# Patient Record
Sex: MALE | Race: WHITE | Employment: FULL TIME | URBAN - METROPOLITAN AREA
[De-identification: names, ages, dates, MRNs, and addresses within clinical notes are randomized per-mention and may not be internally consistent; named-entity substitution may affect disease eponyms.]

---

## 2019-03-12 ENCOUNTER — OFFICE VISIT (OUTPATIENT)
Dept: INTERNAL MEDICINE CLINIC | Age: 49
End: 2019-03-12

## 2019-03-12 VITALS
SYSTOLIC BLOOD PRESSURE: 138 MMHG | HEART RATE: 88 BPM | DIASTOLIC BLOOD PRESSURE: 98 MMHG | WEIGHT: 298 LBS | OXYGEN SATURATION: 95 % | BODY MASS INDEX: 41.72 KG/M2 | HEIGHT: 71 IN

## 2019-03-12 DIAGNOSIS — M79.672 BILATERAL FOOT PAIN: ICD-10-CM

## 2019-03-12 DIAGNOSIS — R73.09 ELEVATED GLUCOSE: ICD-10-CM

## 2019-03-12 DIAGNOSIS — I10 ESSENTIAL HYPERTENSION, BENIGN: Primary | ICD-10-CM

## 2019-03-12 DIAGNOSIS — M79.671 BILATERAL FOOT PAIN: ICD-10-CM

## 2019-03-12 DIAGNOSIS — E78.2 MIXED HYPERLIPIDEMIA: ICD-10-CM

## 2019-03-12 DIAGNOSIS — E66.01 MORBID OBESITY (HCC): ICD-10-CM

## 2019-03-12 LAB — HBA1C MFR BLD: 5.6 %

## 2019-03-12 PROCEDURE — 99214 OFFICE O/P EST MOD 30 MIN: CPT | Performed by: INTERNAL MEDICINE

## 2019-03-12 PROCEDURE — 83036 HEMOGLOBIN GLYCOSYLATED A1C: CPT | Performed by: INTERNAL MEDICINE

## 2019-03-12 RX ORDER — IBUPROFEN 200 MG
200 TABLET ORAL EVERY 6 HOURS PRN
COMMUNITY
End: 2021-12-30

## 2019-03-12 RX ORDER — LISINOPRIL AND HYDROCHLOROTHIAZIDE 12.5; 1 MG/1; MG/1
1 TABLET ORAL DAILY
Qty: 90 TABLET | Refills: 0 | Status: ON HOLD | OUTPATIENT
Start: 2019-03-12 | End: 2022-09-12 | Stop reason: SDUPTHER

## 2019-03-12 SDOH — HEALTH STABILITY: MENTAL HEALTH: HOW OFTEN DO YOU HAVE A DRINK CONTAINING ALCOHOL?: MONTHLY OR LESS

## 2019-03-12 ASSESSMENT — PATIENT HEALTH QUESTIONNAIRE - PHQ9
1. LITTLE INTEREST OR PLEASURE IN DOING THINGS: 0
SUM OF ALL RESPONSES TO PHQ QUESTIONS 1-9: 0
SUM OF ALL RESPONSES TO PHQ QUESTIONS 1-9: 0
SUM OF ALL RESPONSES TO PHQ9 QUESTIONS 1 & 2: 0
2. FEELING DOWN, DEPRESSED OR HOPELESS: 0

## 2021-12-30 ENCOUNTER — APPOINTMENT (OUTPATIENT)
Dept: GENERAL RADIOLOGY | Age: 51
End: 2021-12-30
Payer: COMMERCIAL

## 2021-12-30 ENCOUNTER — HOSPITAL ENCOUNTER (EMERGENCY)
Age: 51
Discharge: HOME | End: 2021-12-30
Attending: STUDENT IN AN ORGANIZED HEALTH CARE EDUCATION/TRAINING PROGRAM
Payer: COMMERCIAL

## 2021-12-30 VITALS
SYSTOLIC BLOOD PRESSURE: 142 MMHG | DIASTOLIC BLOOD PRESSURE: 86 MMHG | TEMPERATURE: 99 F | OXYGEN SATURATION: 95 % | WEIGHT: 300 LBS | HEART RATE: 74 BPM | HEIGHT: 71 IN | RESPIRATION RATE: 18 BRPM | BODY MASS INDEX: 42 KG/M2

## 2021-12-30 DIAGNOSIS — Z20.822 SUSPECTED COVID-19 VIRUS INFECTION: ICD-10-CM

## 2021-12-30 DIAGNOSIS — J06.9 VIRAL URI WITH COUGH: Primary | ICD-10-CM

## 2021-12-30 LAB
RAPID INFLUENZA  B AGN: NEGATIVE
RAPID INFLUENZA A AGN: NEGATIVE
SARS-COV-2: DETECTED

## 2021-12-30 PROCEDURE — 99284 EMERGENCY DEPT VISIT MOD MDM: CPT

## 2021-12-30 PROCEDURE — U0005 INFEC AGEN DETEC AMPLI PROBE: HCPCS

## 2021-12-30 PROCEDURE — U0003 INFECTIOUS AGENT DETECTION BY NUCLEIC ACID (DNA OR RNA); SEVERE ACUTE RESPIRATORY SYNDROME CORONAVIRUS 2 (SARS-COV-2) (CORONAVIRUS DISEASE [COVID-19]), AMPLIFIED PROBE TECHNIQUE, MAKING USE OF HIGH THROUGHPUT TECHNOLOGIES AS DESCRIBED BY CMS-2020-01-R: HCPCS

## 2021-12-30 PROCEDURE — 87804 INFLUENZA ASSAY W/OPTIC: CPT

## 2021-12-30 PROCEDURE — 71045 X-RAY EXAM CHEST 1 VIEW: CPT

## 2021-12-30 ASSESSMENT — PAIN DESCRIPTION - LOCATION: LOCATION: GENERALIZED

## 2021-12-30 ASSESSMENT — PAIN DESCRIPTION - PROGRESSION: CLINICAL_PROGRESSION: NOT CHANGED

## 2021-12-30 ASSESSMENT — PAIN DESCRIPTION - FREQUENCY: FREQUENCY: CONTINUOUS

## 2021-12-30 ASSESSMENT — PAIN DESCRIPTION - PAIN TYPE: TYPE: ACUTE PAIN

## 2021-12-30 ASSESSMENT — PAIN SCALES - GENERAL: PAINLEVEL_OUTOF10: 5

## 2021-12-30 ASSESSMENT — PAIN DESCRIPTION - DESCRIPTORS: DESCRIPTORS: ACHING;SORE

## 2021-12-30 ASSESSMENT — PAIN DESCRIPTION - ONSET: ONSET: ON-GOING

## 2021-12-30 NOTE — ED NOTES
AVS provided and reviewed with the patient. The patient verbalized understanding of care at home, follow up care, and emergent symptoms to return for. No questions or concerns verbalized at this time. The patient is alert, oriented, stable, and ambulatory out of the department at the time of discharge.        Penny Noel RN  12/30/21 9155

## 2021-12-30 NOTE — ED PROVIDER NOTES
[facility] EMERGENCY DEPARTMENT      CHIEF COMPLAINT  URI (c/o cough and aches since Mary day.)     HISTORY OF PRESENT ILLNESS  Rona Hart II is a 49 y.o. male  who presents to the ED complaining of nonproductive cough, fatigue, fevers, and body aches. Patient states that symptoms started on Mary day. States that he was exposed to his children who are apparently sick as well. He states that at times he does become short of breath. He denies any actual chest pain but states that it occasionally does feel tight. He states that he feels similar to when he has had pneumonia in the past and states he has had approximately 5-6 times. He had not taken anything this morning for his symptoms. He did not receive his Covid vaccine. He denies other complaints or concerns. No other complaints, modifying factors or associated symptoms. I have reviewed the following from the nursing documentation.     Past Medical History:   Diagnosis Date    Hernia, umbilical, with gangrene     for the last 3-4 years    Hyperlipidemia 9/10/2015    Hypertension      Past Surgical History:   Procedure Laterality Date    BACK SURGERY  2009    L4-S1 fusion:tire related injury:Dr. Parmar     Family History   Problem Relation Age of Onset    COPD Mother         diverticulititis     Other Father 77        brain tumor:type not known    Other Brother         diverticulitis     Rheum Arthritis Neg Hx     Osteoarthritis Neg Hx     Asthma Neg Hx     Cancer Neg Hx     Diabetes Neg Hx     Heart Failure Neg Hx     High Cholesterol Neg Hx     Hypertension Neg Hx     Migraines Neg Hx     Rashes/Skin Problems Neg Hx     Seizures Neg Hx     Stroke Neg Hx     Thyroid Disease Neg Hx      Social History     Socioeconomic History    Marital status:      Spouse name: Not on file    Number of children: Not on file    Years of education: Not on file    Highest education level: Not on file Occupational History    Occupation: Unemployed   Tobacco Use    Smoking status: Current Some Day Smoker     Packs/day: 0.25     Years: 20.00     Pack years: 5.00     Types: Cigarettes     Last attempt to quit: 2015     Years since quittin.4    Smokeless tobacco: Never Used    Tobacco comment: trying to quit    Substance and Sexual Activity    Alcohol use: Yes     Alcohol/week: 0.0 standard drinks     Comment: very rarely    Drug use: No    Sexual activity: Not Currently   Other Topics Concern    Not on file   Social History Narrative    Not on file     Social Determinants of Health     Financial Resource Strain:     Difficulty of Paying Living Expenses: Not on file   Food Insecurity:     Worried About Running Out of Food in the Last Year: Not on file    [place] of Food in the Last Year: Not on file   Transportation Needs:     Lack of Transportation (Medical): Not on file    Lack of Transportation (Non-Medical): Not on file   Physical Activity:     Days of Exercise per Week: Not on file    Minutes of Exercise per Session: Not on file   Stress:     Feeling of Stress : Not on file   Social Connections:     Frequency of Communication with Friends and Family: Not on file    Frequency of Social Gatherings with Friends and Family: Not on file    Attends Restoration Services: Not on file    Active Member of [address] [place] [address] [employer] or Organizations: Not on file    Attends Club or Organization Meetings: Not on file    Marital Status: Not on file   Intimate Partner Violence:     Fear of Current or Ex-Partner: Not on file    Emotionally Abused: Not on file    Physically Abused: Not on file    Sexually Abused: Not on file   Housing Stability:     Unable to Pay for Housing in the Last Year: Not on file    Number of [place] in the Last Year: Not on file    Unstable Housing in the Last Year: Not on file     No current facility-administered medications for this encounter.      Current Outpatient Medications body aches. Full HPI as detailed above. Upon arrival in the ED, vitals reassuring. Patient is resting comfortably is no acute distress. Rapid flu is negative. Chest x-ray without any acute cardiopulmonary process. Suspect possible Covid infection. Advised to take over-the-counter Tylenol/Motrin and over-the-counter cold medications for symptoms. Covid test will be sent, patient was advised to treat himself is that he is positive in the meantime until test results return. Given strict return precautions for the ED. He is comfortable in agreement plan of care was discharged. During the patient's ED course, the patient was given:  Medications - No data to display     CLINICAL IMPRESSION  1. Viral URI with cough    2. Suspected COVID-19 virus infection        Blood pressure (!) 142/86, pulse 74, temperature 99 °F (37.2 °C), temperature source Oral, resp. rate 18, height 5' 11\" (1.803 m), weight 300 lb (136.1 kg), SpO2 95 %. DISPOSITION  Yohannes Pérez II was discharged to home in good condition. Patient was given scripts for the following medications. I counseled patient how to take these medications. Discharge Medication List as of 12/30/2021 11:29 AM          Follow-up with:  [place] [address]  [phone]    Schedule an appointment as soon as possible for a visit       [place] [address]. [facility] Emergency Department  [address] [place] [address],[address]  [phone]  Go to   If symptoms worsen      DISCLAIMER: This chart was created using Dragon dictation software. Efforts were made by me to ensure accuracy, however some errors may be present due to limitations of this technology and occasionally words are not transcribed correctly.        Shoaib Perrin MD  12/30/21 1480

## 2022-09-09 ENCOUNTER — APPOINTMENT (OUTPATIENT)
Dept: CT IMAGING | Age: 52
End: 2022-09-09
Payer: COMMERCIAL

## 2022-09-09 ENCOUNTER — HOSPITAL ENCOUNTER (OUTPATIENT)
Age: 52
Setting detail: OBSERVATION
Discharge: HOME | End: 2022-09-12
Attending: EMERGENCY MEDICINE | Admitting: INTERNAL MEDICINE
Payer: COMMERCIAL

## 2022-09-09 DIAGNOSIS — H70.90 MASTOIDITIS, UNSPECIFIED LATERALITY: ICD-10-CM

## 2022-09-09 DIAGNOSIS — H81.399 PERIPHERAL VERTIGO, UNSPECIFIED LATERALITY: Primary | ICD-10-CM

## 2022-09-09 PROBLEM — H70.003: Status: ACTIVE | Noted: 2022-09-09

## 2022-09-09 LAB
A/G RATIO: 1.3 (ref 1.1–2.2)
ALBUMIN SERPL-MCNC: 4.3 G/DL (ref 3.4–5)
ALP BLD-CCNC: 79 U/L (ref 40–129)
ALT SERPL-CCNC: 14 U/L (ref 10–40)
ANION GAP SERPL CALCULATED.3IONS-SCNC: 11 MMOL/L (ref 3–16)
AST SERPL-CCNC: 17 U/L (ref 15–37)
BASOPHILS ABSOLUTE: 0.1 K/UL (ref 0–0.2)
BASOPHILS RELATIVE PERCENT: 0.9 %
BILIRUB SERPL-MCNC: 0.3 MG/DL (ref 0–1)
BUN BLDV-MCNC: 16 MG/DL (ref 7–20)
CALCIUM SERPL-MCNC: 8.8 MG/DL (ref 8.3–10.6)
CHLORIDE BLD-SCNC: 100 MMOL/L (ref 99–110)
CO2: 26 MMOL/L (ref 21–32)
CREAT SERPL-MCNC: 0.8 MG/DL (ref 0.9–1.3)
EKG ATRIAL RATE: 75 BPM
EKG DIAGNOSIS: NORMAL
EKG P AXIS: 45 DEGREES
EKG P-R INTERVAL: 150 MS
EKG Q-T INTERVAL: 406 MS
EKG QRS DURATION: 86 MS
EKG QTC CALCULATION (BAZETT): 453 MS
EKG R AXIS: 6 DEGREES
EKG T AXIS: 26 DEGREES
EKG VENTRICULAR RATE: 75 BPM
EOSINOPHILS ABSOLUTE: 0.2 K/UL (ref 0–0.6)
EOSINOPHILS RELATIVE PERCENT: 1.6 %
GFR AFRICAN AMERICAN: >60
GFR NON-AFRICAN AMERICAN: >60
GLUCOSE BLD-MCNC: 146 MG/DL (ref 70–99)
HCT VFR BLD CALC: 44 % (ref 40.5–52.5)
HEMOGLOBIN: 15 G/DL (ref 13.5–17.5)
LYMPHOCYTES ABSOLUTE: 1.5 K/UL (ref 1–5.1)
LYMPHOCYTES RELATIVE PERCENT: 13.3 %
MCH RBC QN AUTO: 29.9 PG (ref 26–34)
MCHC RBC AUTO-ENTMCNC: 34.1 G/DL (ref 31–36)
MCV RBC AUTO: 87.5 FL (ref 80–100)
MONOCYTES ABSOLUTE: 0.7 K/UL (ref 0–1.3)
MONOCYTES RELATIVE PERCENT: 6 %
NEUTROPHILS ABSOLUTE: 8.8 K/UL (ref 1.7–7.7)
NEUTROPHILS RELATIVE PERCENT: 78.2 %
PDW BLD-RTO: 15 % (ref 12.4–15.4)
PLATELET # BLD: 253 K/UL (ref 135–450)
PMV BLD AUTO: 8.3 FL (ref 5–10.5)
POTASSIUM REFLEX MAGNESIUM: 3.8 MMOL/L (ref 3.5–5.1)
RBC # BLD: 5.02 M/UL (ref 4.2–5.9)
SARS-COV-2, NAAT: NOT DETECTED
SODIUM BLD-SCNC: 137 MMOL/L (ref 136–145)
TOTAL PROTEIN: 7.5 G/DL (ref 6.4–8.2)
TROPONIN: <0.01 NG/ML
WBC # BLD: 11.3 K/UL (ref 4–11)

## 2022-09-09 PROCEDURE — 96368 THER/DIAG CONCURRENT INF: CPT

## 2022-09-09 PROCEDURE — 80053 COMPREHEN METABOLIC PANEL: CPT

## 2022-09-09 PROCEDURE — 6360000004 HC RX CONTRAST MEDICATION: Performed by: EMERGENCY MEDICINE

## 2022-09-09 PROCEDURE — 93010 ELECTROCARDIOGRAM REPORT: CPT | Performed by: INTERNAL MEDICINE

## 2022-09-09 PROCEDURE — 99285 EMERGENCY DEPT VISIT HI MDM: CPT

## 2022-09-09 PROCEDURE — 96366 THER/PROPH/DIAG IV INF ADDON: CPT

## 2022-09-09 PROCEDURE — 70450 CT HEAD/BRAIN W/O DYE: CPT

## 2022-09-09 PROCEDURE — G0378 HOSPITAL OBSERVATION PER HR: HCPCS

## 2022-09-09 PROCEDURE — 6370000000 HC RX 637 (ALT 250 FOR IP): Performed by: EMERGENCY MEDICINE

## 2022-09-09 PROCEDURE — 70496 CT ANGIOGRAPHY HEAD: CPT

## 2022-09-09 PROCEDURE — 84484 ASSAY OF TROPONIN QUANT: CPT

## 2022-09-09 PROCEDURE — 96372 THER/PROPH/DIAG INJ SC/IM: CPT

## 2022-09-09 PROCEDURE — 96367 TX/PROPH/DG ADDL SEQ IV INF: CPT

## 2022-09-09 PROCEDURE — 2580000003 HC RX 258: Performed by: NURSE PRACTITIONER

## 2022-09-09 PROCEDURE — 85025 COMPLETE CBC W/AUTO DIFF WBC: CPT

## 2022-09-09 PROCEDURE — 96376 TX/PRO/DX INJ SAME DRUG ADON: CPT

## 2022-09-09 PROCEDURE — 96375 TX/PRO/DX INJ NEW DRUG ADDON: CPT

## 2022-09-09 PROCEDURE — 93005 ELECTROCARDIOGRAM TRACING: CPT | Performed by: EMERGENCY MEDICINE

## 2022-09-09 PROCEDURE — 6360000002 HC RX W HCPCS: Performed by: NURSE PRACTITIONER

## 2022-09-09 PROCEDURE — 96361 HYDRATE IV INFUSION ADD-ON: CPT

## 2022-09-09 PROCEDURE — 87635 SARS-COV-2 COVID-19 AMP PRB: CPT

## 2022-09-09 PROCEDURE — 2580000003 HC RX 258: Performed by: EMERGENCY MEDICINE

## 2022-09-09 PROCEDURE — 36415 COLL VENOUS BLD VENIPUNCTURE: CPT

## 2022-09-09 PROCEDURE — 6370000000 HC RX 637 (ALT 250 FOR IP): Performed by: NURSE PRACTITIONER

## 2022-09-09 PROCEDURE — 6360000002 HC RX W HCPCS: Performed by: EMERGENCY MEDICINE

## 2022-09-09 PROCEDURE — 96365 THER/PROPH/DIAG IV INF INIT: CPT

## 2022-09-09 RX ORDER — POLYETHYLENE GLYCOL 3350 17 G/17G
17 POWDER, FOR SOLUTION ORAL DAILY PRN
Status: DISCONTINUED | OUTPATIENT
Start: 2022-09-09 | End: 2022-09-12 | Stop reason: HOSPADM

## 2022-09-09 RX ORDER — DIAZEPAM 2 MG/1
2 TABLET ORAL EVERY 6 HOURS PRN
Status: DISCONTINUED | OUTPATIENT
Start: 2022-09-09 | End: 2022-09-11

## 2022-09-09 RX ORDER — ACETAMINOPHEN 650 MG/1
650 SUPPOSITORY RECTAL EVERY 6 HOURS PRN
Status: DISCONTINUED | OUTPATIENT
Start: 2022-09-09 | End: 2022-09-12 | Stop reason: HOSPADM

## 2022-09-09 RX ORDER — 0.9 % SODIUM CHLORIDE 0.9 %
1000 INTRAVENOUS SOLUTION INTRAVENOUS ONCE
Status: COMPLETED | OUTPATIENT
Start: 2022-09-09 | End: 2022-09-09

## 2022-09-09 RX ORDER — ENOXAPARIN SODIUM 100 MG/ML
30 INJECTION SUBCUTANEOUS 2 TIMES DAILY
Status: DISCONTINUED | OUTPATIENT
Start: 2022-09-09 | End: 2022-09-12

## 2022-09-09 RX ORDER — ONDANSETRON 2 MG/ML
4 INJECTION INTRAMUSCULAR; INTRAVENOUS ONCE
Status: COMPLETED | OUTPATIENT
Start: 2022-09-09 | End: 2022-09-09

## 2022-09-09 RX ORDER — MECLIZINE HYDROCHLORIDE CHEWABLE TABLETS 25 MG/1
25 TABLET, CHEWABLE ORAL ONCE
Status: COMPLETED | OUTPATIENT
Start: 2022-09-09 | End: 2022-09-09

## 2022-09-09 RX ORDER — LISINOPRIL AND HYDROCHLOROTHIAZIDE 12.5; 1 MG/1; MG/1
1 TABLET ORAL DAILY
Status: DISCONTINUED | OUTPATIENT
Start: 2022-09-10 | End: 2022-09-12 | Stop reason: HOSPADM

## 2022-09-09 RX ORDER — ONDANSETRON 2 MG/ML
4 INJECTION INTRAMUSCULAR; INTRAVENOUS EVERY 6 HOURS PRN
Status: DISCONTINUED | OUTPATIENT
Start: 2022-09-09 | End: 2022-09-12 | Stop reason: HOSPADM

## 2022-09-09 RX ORDER — SODIUM CHLORIDE 0.9 % (FLUSH) 0.9 %
5-40 SYRINGE (ML) INJECTION EVERY 12 HOURS SCHEDULED
Status: DISCONTINUED | OUTPATIENT
Start: 2022-09-09 | End: 2022-09-12 | Stop reason: HOSPADM

## 2022-09-09 RX ORDER — ONDANSETRON 4 MG/1
4 TABLET, ORALLY DISINTEGRATING ORAL EVERY 8 HOURS PRN
Status: DISCONTINUED | OUTPATIENT
Start: 2022-09-09 | End: 2022-09-12 | Stop reason: HOSPADM

## 2022-09-09 RX ORDER — SODIUM CHLORIDE 0.9 % (FLUSH) 0.9 %
5-40 SYRINGE (ML) INJECTION PRN
Status: DISCONTINUED | OUTPATIENT
Start: 2022-09-09 | End: 2022-09-12 | Stop reason: HOSPADM

## 2022-09-09 RX ORDER — DIAZEPAM 5 MG/1
5 TABLET ORAL ONCE
Status: COMPLETED | OUTPATIENT
Start: 2022-09-09 | End: 2022-09-09

## 2022-09-09 RX ORDER — ACETAMINOPHEN 325 MG/1
650 TABLET ORAL EVERY 6 HOURS PRN
Status: DISCONTINUED | OUTPATIENT
Start: 2022-09-09 | End: 2022-09-12 | Stop reason: HOSPADM

## 2022-09-09 RX ORDER — SODIUM CHLORIDE 9 MG/ML
INJECTION, SOLUTION INTRAVENOUS PRN
Status: DISCONTINUED | OUTPATIENT
Start: 2022-09-09 | End: 2022-09-12 | Stop reason: HOSPADM

## 2022-09-09 RX ORDER — MECLIZINE HYDROCHLORIDE 25 MG/1
12.5 TABLET ORAL EVERY 8 HOURS
Status: DISCONTINUED | OUTPATIENT
Start: 2022-09-09 | End: 2022-09-10

## 2022-09-09 RX ADMIN — MECLIZINE HYDROCHLORIDE 12.5 MG: 25 TABLET ORAL at 20:51

## 2022-09-09 RX ADMIN — ONDANSETRON HYDROCHLORIDE 4 MG: 2 INJECTION, SOLUTION INTRAMUSCULAR; INTRAVENOUS at 15:14

## 2022-09-09 RX ADMIN — ENOXAPARIN SODIUM 30 MG: 100 INJECTION SUBCUTANEOUS at 20:51

## 2022-09-09 RX ADMIN — MECLIZINE HYDROCHLORIDE 25 MG: 25 TABLET, CHEWABLE ORAL at 14:16

## 2022-09-09 RX ADMIN — IOPAMIDOL 85 ML: 755 INJECTION, SOLUTION INTRAVENOUS at 13:59

## 2022-09-09 RX ADMIN — Medication 10 ML: at 20:51

## 2022-09-09 RX ADMIN — VANCOMYCIN HYDROCHLORIDE 2000 MG: 1 INJECTION, POWDER, LYOPHILIZED, FOR SOLUTION INTRAVENOUS at 17:10

## 2022-09-09 RX ADMIN — ONDANSETRON HYDROCHLORIDE 4 MG: 2 INJECTION, SOLUTION INTRAMUSCULAR; INTRAVENOUS at 20:51

## 2022-09-09 RX ADMIN — DIAZEPAM 5 MG: 5 TABLET ORAL at 14:16

## 2022-09-09 RX ADMIN — SODIUM CHLORIDE 1000 ML: 9 INJECTION, SOLUTION INTRAVENOUS at 14:16

## 2022-09-09 RX ADMIN — CEFEPIME 2000 MG: 2 INJECTION, POWDER, FOR SOLUTION INTRAVENOUS at 16:23

## 2022-09-09 ASSESSMENT — LIFESTYLE VARIABLES
HOW MANY STANDARD DRINKS CONTAINING ALCOHOL DO YOU HAVE ON A TYPICAL DAY: 1 OR 2
HOW OFTEN DO YOU HAVE A DRINK CONTAINING ALCOHOL: 2-4 TIMES A MONTH

## 2022-09-09 ASSESSMENT — PAIN - FUNCTIONAL ASSESSMENT: PAIN_FUNCTIONAL_ASSESSMENT: NONE - DENIES PAIN

## 2022-09-09 NOTE — ED NOTES
Admitting RN at bedside, report given. Admitting RN advised that room is not clean, and that she will call when room is ready.       Fariba Castillo RN  09/09/22 3546

## 2022-09-09 NOTE — ED NOTES
[address] [facility]- Perfect Serve sent to Dr. Nat Rhodes. Torito Nap  09/09/22 1548     1645-Perfect Serve returned from HAROON Recinos 1617- Call placed to [facility] for consult for ENT. Torito Nap  09/09/22 1702    1722- Call back received from [facility] stating that they have ENT Dr. Joey Justice on the line. Transferred the call to Dr. Amari Alvarenga cell.       [facility]  09/09/22 [address] [facility]  09/09/22 173

## 2022-09-09 NOTE — PLAN OF CARE
Admit to 2w observation    Bilateral mastoiditis - with peripheral vertigo   Dr. Livier Flores discussed with ENT which recommended no antibiotics and to treat symptomatically   Scheduled Meclizine  PRN valium  PRN Zofran       Discussed plan of care with Dr. Mancilla, APRN - CNP

## 2022-09-09 NOTE — LETTER
[phone]  Phone: [phone]             September 12, 2022    Patient: Carmen Alcala II   YOB: 1970   Date of Visit: 9/9/2022       To Whom It May Concern:    Carmen Alcala was seen and treated in our facility  beginning 9/9/2022 until 9/12/2022. He may return to work on 9/19/2022.       Sincerely,       Garret Zhao RN         Signature:__________________________________

## 2022-09-10 LAB
ANION GAP SERPL CALCULATED.3IONS-SCNC: 8 MMOL/L (ref 3–16)
BASOPHILS ABSOLUTE: 0.1 K/UL (ref 0–0.2)
BASOPHILS RELATIVE PERCENT: 0.6 %
BUN BLDV-MCNC: 16 MG/DL (ref 7–20)
CALCIUM SERPL-MCNC: 8.6 MG/DL (ref 8.3–10.6)
CHLORIDE BLD-SCNC: 101 MMOL/L (ref 99–110)
CO2: 29 MMOL/L (ref 21–32)
CREAT SERPL-MCNC: 0.8 MG/DL (ref 0.9–1.3)
EOSINOPHILS ABSOLUTE: 0.1 K/UL (ref 0–0.6)
EOSINOPHILS RELATIVE PERCENT: 1.1 %
GFR AFRICAN AMERICAN: >60
GFR NON-AFRICAN AMERICAN: >60
GLUCOSE BLD-MCNC: 98 MG/DL (ref 70–99)
HCT VFR BLD CALC: 42.9 % (ref 40.5–52.5)
HEMOGLOBIN: 14.1 G/DL (ref 13.5–17.5)
LYMPHOCYTES ABSOLUTE: 1.9 K/UL (ref 1–5.1)
LYMPHOCYTES RELATIVE PERCENT: 17.8 %
MCH RBC QN AUTO: 29.2 PG (ref 26–34)
MCHC RBC AUTO-ENTMCNC: 32.9 G/DL (ref 31–36)
MCV RBC AUTO: 88.7 FL (ref 80–100)
MONOCYTES ABSOLUTE: 0.7 K/UL (ref 0–1.3)
MONOCYTES RELATIVE PERCENT: 6.9 %
NEUTROPHILS ABSOLUTE: 7.8 K/UL (ref 1.7–7.7)
NEUTROPHILS RELATIVE PERCENT: 73.6 %
PDW BLD-RTO: 15.2 % (ref 12.4–15.4)
PLATELET # BLD: 281 K/UL (ref 135–450)
PMV BLD AUTO: 8.9 FL (ref 5–10.5)
POTASSIUM REFLEX MAGNESIUM: 4.2 MMOL/L (ref 3.5–5.1)
RBC # BLD: 4.84 M/UL (ref 4.2–5.9)
SODIUM BLD-SCNC: 138 MMOL/L (ref 136–145)
WBC # BLD: 10.6 K/UL (ref 4–11)

## 2022-09-10 PROCEDURE — 2580000003 HC RX 258: Performed by: NURSE PRACTITIONER

## 2022-09-10 PROCEDURE — 6370000000 HC RX 637 (ALT 250 FOR IP): Performed by: NURSE PRACTITIONER

## 2022-09-10 PROCEDURE — 99234 HOSP IP/OBS SM DT SF/LOW 45: CPT | Performed by: INTERNAL MEDICINE

## 2022-09-10 PROCEDURE — 36415 COLL VENOUS BLD VENIPUNCTURE: CPT

## 2022-09-10 PROCEDURE — 80048 BASIC METABOLIC PNL TOTAL CA: CPT

## 2022-09-10 PROCEDURE — 85025 COMPLETE CBC W/AUTO DIFF WBC: CPT

## 2022-09-10 PROCEDURE — 6370000000 HC RX 637 (ALT 250 FOR IP): Performed by: INTERNAL MEDICINE

## 2022-09-10 PROCEDURE — 6360000002 HC RX W HCPCS: Performed by: NURSE PRACTITIONER

## 2022-09-10 PROCEDURE — G0378 HOSPITAL OBSERVATION PER HR: HCPCS

## 2022-09-10 PROCEDURE — 96372 THER/PROPH/DIAG INJ SC/IM: CPT

## 2022-09-10 RX ORDER — PREDNISONE 20 MG/1
40 TABLET ORAL DAILY
Status: DISCONTINUED | OUTPATIENT
Start: 2022-09-10 | End: 2022-09-12 | Stop reason: HOSPADM

## 2022-09-10 RX ORDER — MECLIZINE HYDROCHLORIDE 25 MG/1
25 TABLET ORAL 3 TIMES DAILY PRN
Status: DISCONTINUED | OUTPATIENT
Start: 2022-09-10 | End: 2022-09-12 | Stop reason: HOSPADM

## 2022-09-10 RX ADMIN — LISINOPRIL AND HYDROCHLOROTHIAZIDE 1 TABLET: 12.5; 1 TABLET ORAL at 08:57

## 2022-09-10 RX ADMIN — Medication 10 ML: at 19:58

## 2022-09-10 RX ADMIN — ACETAMINOPHEN 650 MG: 325 TABLET ORAL at 08:57

## 2022-09-10 RX ADMIN — PREDNISONE 40 MG: 20 TABLET ORAL at 12:35

## 2022-09-10 RX ADMIN — ENOXAPARIN SODIUM 30 MG: 100 INJECTION SUBCUTANEOUS at 08:57

## 2022-09-10 RX ADMIN — MECLIZINE HYDROCHLORIDE 12.5 MG: 25 TABLET ORAL at 05:38

## 2022-09-10 RX ADMIN — Medication 10 ML: at 08:58

## 2022-09-10 RX ADMIN — MECLIZINE HYDROCHLORIDE 25 MG: 25 TABLET ORAL at 12:35

## 2022-09-10 RX ADMIN — ENOXAPARIN SODIUM 30 MG: 100 INJECTION SUBCUTANEOUS at 19:57

## 2022-09-10 RX ADMIN — MECLIZINE HYDROCHLORIDE 25 MG: 25 TABLET ORAL at 19:57

## 2022-09-10 ASSESSMENT — PAIN DESCRIPTION - LOCATION: LOCATION: HEAD

## 2022-09-10 ASSESSMENT — PAIN DESCRIPTION - DESCRIPTORS: DESCRIPTORS: PRESSURE

## 2022-09-10 NOTE — PROGRESS NOTES
Physical Therapy    Patient was followed up twice today for PT evaluation. Patient had moderate to severe dizziness at rest while in the bed and was unable to participate in the evaluation. Patient was educated about his medical condition and coping techniques. Patient will be followed up later when able to participate. No charge.     Alvaro Rowe, MS PT, # [id number]

## 2022-09-10 NOTE — H&P
Admission [facility];  MRN: [id number] ;   Admit Date: 2022 12:48 PM   Current Date and Time: 9/10/2022 11:48 AM    PCP  --  Serena Lizarraga DO         Chief Complaint   Patient presents with    Dizziness     Pt experiencing some dizziness. Has had episodes before but it goes away. Dizzy when he moves his head. Feels pressure in his face and feels nauseated. HPI:  Patient is a 47 y.o.  male  With known h.o  HTN, hyperlipidemia, obesity presented for increasing dizziness for last 2 days. Reports he has bilateral ear pain with no fevers or chills. No associated blurry vision or double vision or neck pain . Reports dizziness occurs anytime he bends or moves his neck and room spins around and feels he is going to fall. No unilateral arm or leg weakness . No new headache or recent trauma. No recent surgeries . Workup in ER showed possible bilateral mastoiditis and no relief with temporary meds and was admitted for overnight observation     Pt denies any similar symptoms in the past or hx of ear issues     No Known Allergies    Past Medical History:   Diagnosis Date    Hernia, umbilical, with gangrene     for the last 3-4 years    Hyperlipidemia 9/10/2015    Hypertension       Past Surgical History:   Procedure Laterality Date    BACK SURGERY      L4-S1 fusion:tire related injury:Dr. Parmar      Medications Prior to Admission: lisinopril-hydrochlorothiazide (PRINZIDE;ZESTORETIC) 10-12.5 MG per tablet, Take 1 tablet by mouth daily (Patient not taking: Reported on 2022)  No Known Allergies   Social History     Tobacco Use    Smoking status: Some Days     Packs/day: 0.25     Years: 20.00     Pack years: 5.00     Types: Cigarettes     Last attempt to quit: 2015     Years since quittin.1    Smokeless tobacco: Never    Tobacco comments:     trying to quit    Substance Use Topics    Alcohol use:  Yes     Alcohol/week: 0.0 standard drinks     Comment: very rarely      Family History   Problem Relation Age of Onset    COPD Mother         diverticulititis     Other Father 77        brain tumor:type not known    Other Brother         diverticulitis     Rheum Arthritis Neg Hx     Osteoarthritis Neg Hx     Asthma Neg Hx     Cancer Neg Hx     Diabetes Neg Hx     Heart Failure Neg Hx     High Cholesterol Neg Hx     Hypertension Neg Hx     Migraines Neg Hx     Rashes/Skin Problems Neg Hx     Seizures Neg Hx     Stroke Neg Hx     Thyroid Disease Neg Hx           Review of systems      Constitutional: Negative for fever or chills  HENT: Negative for sore throat  + bilateral ear ache   Eyes: Negative for redness   Respiratory: Negative  for dyspnea, cough   Cardiovascular: Negative for chest pain   Gastrointestinal:neg for abd pain, nausea or vomiting or diarrhea  No melena or BRPR  Genitourinary: Negative for hematuria   Musculoskeletal: Negative for arthralgias   Skin: Negative for rash   Neurological: Negative for syncope  + dizziness  Hematological: Negative for adenopathy   Psychiatric/Behavorial: Negative for anxiety    Objective:     VS: Temp: 97.6 °F (36.4 °C)  Heart Rate: 78  BP: (!) 138/97  SpO2: 93 %        Physical Exam:  General:  obese middle aged male, Awake, alert and oriented. Appears to be not in any distress  Mucous Membranes:  Pink , anicteric  No mastoid tenderness noted  External ear normal   EOM intact, no nystagmus. Symptoms not reproducible today  Neck: No JVD, no carotid bruit, no thyromegaly  Chest:  Clear to auscultation bilaterally, no added sounds  Cardiovascular:  RRR S1S2 heard, no murmurs or gallops  Abdomen:  Soft, undistended, non tender, no organomegaly, BS present  Extremities: No edema or cyanosis.  Distal pulses well felt  Neurological : no focal deficits  Non focal   CN 2 to 12 intact, coordination normal   Gait not checked as pt reported symptoms         LABS:  CBC:  Lab Results   Component Value Date/Time    WBC 10.6 09/10/2022 05:51 AM HGB 14.1 09/10/2022 05:51 AM    HCT 42.9 09/10/2022 05:51 AM    MCV 88.7 09/10/2022 05:51 AM     09/10/2022 05:51 AM    NEUTOPHILPCT 73.6 09/10/2022 05:51 AM    LYMPHOPCT 17.8 09/10/2022 05:51 AM    MONOPCT 6.9 09/10/2022 05:51 AM    BASOPCT 0.6 09/10/2022 05:51 AM    NEUTROABS 7.8 09/10/2022 05:51 AM    LYMPHSABS 1.9 09/10/2022 05:51 AM    MONOSABS 0.7 09/10/2022 05:51 AM    EOSABS 0.1 09/10/2022 05:51 AM    BASOSABS 0.1 09/10/2022 05:51 AM     BMP:   Lab Results   Component Value Date/Time     09/10/2022 05:51 AM    K 4.2 09/10/2022 05:51 AM    CO2 29 09/10/2022 05:51 AM    BUN 16 09/10/2022 05:51 AM    CREATININE 0.8 09/10/2022 05:51 AM    CALCIUM 8.6 09/10/2022 05:51 AM     Coagulation: No results found for: INR, APTT  Cardiac markers:   Lab Results   Component Value Date/Time    TROPONINI <0.01 09/09/2022 01:05 PM     ABGs: No results found for: POCPH, POCPCO2, POCPO2, POCHCO3, POCTCO2, POCFIO2    Lab Results   Component Value Date    ALT 14 09/09/2022    AST 17 09/09/2022    ALKPHOS 79 09/09/2022    BILITOT 0.3 09/09/2022       No results found for: INR, PROTIME      EKG: NSR     Radiology:    CTA head and neck     1. No evidence of a flow limiting stenosis or dissection of the cervical   carotid/vertebral arteries. 2. No significant stenosis of the [id number]. Ct head    no acute intracranial abnormalities are noted. Bilateral mastoiditis      ASSESMENT & PLAN:     Bilateral mastoiditis - doubt this although ct reported  Pt with no mastoid tenderness, no fevers  Continue abx - change to oral    Dizziness - likely peripheral vertigo - CTA head and neck neg  Ct head neg  No focal symptoms  Obtain PT eval   Meclizine prn . Avoid scheduled dose given rebound symptoms     HTN - resume home meds.  Check orthostatics    Diet:regular   GI/DVT PX: Kathie Carrillo  CODE STATUS: full     Satnam Moncada MD,  9/10/2022 11:48 AM

## 2022-09-10 NOTE — PROGRESS NOTES
4 Eyes Skin Assessment     The patient is being assess for   Admission    I agree that 2 RN's have performed a thorough Head to Toe Skin Assessment on the patient. ALL assessment sites listed below have been assessed. Areas assessed for pressure by both nurses:   [x]   Head, Face, and Ears   [x]   Shoulders, Back, and Chest, Abdomen  [x]   Arms, Elbows, and Hands   [x]   Coccyx, Sacrum, and Ischium  [x]   Legs, Feet, and Heels    Umbilical hernia. No open areas. Skin Assessed Under all Medical Devices by both nurses:  NA            All Mepilex Borders were peeled back and area peeked at by both nurses:  NA  Please list where Mepilex Borders are located:  NA             **SHARE this note so that the co-signing nurse is able to place an eSignature**    Co-signer eSignature: {Esignature:[id number]}    Does the Patient have Skin Breakdown related to pressure?   No            Jose Angel Prevention initiated:  NA   Wound Care Orders initiated:  NA      WOC nurse consulted for Pressure Injury (Stage 3,4, Unstageable, DTI, NWPT, Complex wounds)and New or Established Ostomies:  NA      Primary Nurse eSignature: Electronically signed by Stanley Soto RN on 9/10/22 at 1:51 AM EDT

## 2022-09-11 PROBLEM — H81.399 PERIPHERAL VERTIGO: Status: ACTIVE | Noted: 2022-09-11

## 2022-09-11 PROBLEM — H70.90 MASTOIDITIS: Status: ACTIVE | Noted: 2022-09-11

## 2022-09-11 PROCEDURE — 97116 GAIT TRAINING THERAPY: CPT

## 2022-09-11 PROCEDURE — 6370000000 HC RX 637 (ALT 250 FOR IP): Performed by: NURSE PRACTITIONER

## 2022-09-11 PROCEDURE — 6360000002 HC RX W HCPCS: Performed by: NURSE PRACTITIONER

## 2022-09-11 PROCEDURE — 2580000003 HC RX 258: Performed by: INTERNAL MEDICINE

## 2022-09-11 PROCEDURE — 6370000000 HC RX 637 (ALT 250 FOR IP): Performed by: INTERNAL MEDICINE

## 2022-09-11 PROCEDURE — G0378 HOSPITAL OBSERVATION PER HR: HCPCS

## 2022-09-11 PROCEDURE — 6360000002 HC RX W HCPCS: Performed by: INTERNAL MEDICINE

## 2022-09-11 PROCEDURE — 2580000003 HC RX 258: Performed by: NURSE PRACTITIONER

## 2022-09-11 PROCEDURE — 96367 TX/PROPH/DG ADDL SEQ IV INF: CPT

## 2022-09-11 PROCEDURE — 99224 PR SBSQ OBSERVATION CARE/DAY 15 MINUTES: CPT | Performed by: INTERNAL MEDICINE

## 2022-09-11 PROCEDURE — 96372 THER/PROPH/DIAG INJ SC/IM: CPT

## 2022-09-11 PROCEDURE — 97530 THERAPEUTIC ACTIVITIES: CPT

## 2022-09-11 PROCEDURE — 97161 PT EVAL LOW COMPLEX 20 MIN: CPT

## 2022-09-11 RX ADMIN — ENOXAPARIN SODIUM 30 MG: 100 INJECTION SUBCUTANEOUS at 09:39

## 2022-09-11 RX ADMIN — PREDNISONE 40 MG: 20 TABLET ORAL at 09:39

## 2022-09-11 RX ADMIN — ENOXAPARIN SODIUM 30 MG: 100 INJECTION SUBCUTANEOUS at 21:10

## 2022-09-11 RX ADMIN — SODIUM CHLORIDE: 9 INJECTION, SOLUTION INTRAVENOUS at 11:36

## 2022-09-11 RX ADMIN — CEFTRIAXONE SODIUM 1000 MG: 1 INJECTION, POWDER, FOR SOLUTION INTRAMUSCULAR; INTRAVENOUS at 11:37

## 2022-09-11 RX ADMIN — MECLIZINE HYDROCHLORIDE 25 MG: 25 TABLET ORAL at 09:39

## 2022-09-11 RX ADMIN — Medication 10 ML: at 09:40

## 2022-09-11 RX ADMIN — Medication 10 ML: at 21:10

## 2022-09-11 RX ADMIN — LISINOPRIL AND HYDROCHLOROTHIAZIDE 1 TABLET: 12.5; 1 TABLET ORAL at 09:39

## 2022-09-11 NOTE — FLOWSHEET NOTE
09/11/22 0930   Vital Signs   Temp 97.4 °F (36.3 °C)   Temp Source Oral   Heart Rate 76   Heart Rate Source Monitor   Resp 16   BP (!) 143/96   BP Location Right upper arm   BP Method Automatic   MAP (Calculated) 111.67   Patient Position Supine   Level of Consciousness 0   MEWS Score 1   Oxygen Therapy   SpO2 95 %   O2 Device None (Room air)   Shift assessment complete - See flow sheet. Medications given - See [facility]   Patient with no complaints of pain at this time. Patient denies any further needs. Bed alarm is deferred, uses call light appropriately. A/O   Call light in reach  Bedside Mobility Assessment Tool (BMAT):     Assessment Level 1- Sit and Shake    1. From a semi-reclined position, ask patient to sit up and rotate to a seated position at the side of the bed. Can use the bedrail. 2. Ask patient to reach out and grab your hand and shake making sure patient reaches across his/her midline. Pass- Patient is able to come to a seated position, maintain core strength. Maintains seated balance while reaching across midline. Move on to Assessment Level 2. Assessment Level 2- Stretch and Point   1. With patient in seated position at the side of the bed, have patient place both feet on the floor (or stool) with knees no higher than hips. 2. Ask patient to stretch one leg and straighten the knee, then bend the ankle/flex and point the toes. If appropriate, repeat with the other leg. Pass- Patient is able to demonstrate appropriate quad strength on intended weight bearing limb(s). Move onto Assessment Level 3. Assessment Level 3- Stand   1. Ask patient to elevate off the bed or chair (seated to standing) using an assistive device (cane, bedrail). 2. Patient should be able to raise buttocks off be and hold for a count of five. May repeat once. Pass- Patient maintains standing stability for at least 5 seconds, proceed to assessment level 4. Assessment Level 4- Walk   1.  Ask patient to march in place at bedside. 2. Then ask patient to advance step and return each foot. Some medical conditions may render a patient from stepping backwards, use your best clinical judgement. Pass- Patient demonstrates balance while shifting weight and ability to step, takes independent steps, does not use assistive device patient is MOBILITY LEVEL 4.       Mobility Level- 4

## 2022-09-11 NOTE — PROGRESS NOTES
Inpatient Physical Therapy Evaluation and Treatment    Unit: [facility]  Date:  9/11/2022  Patient Name:    Joya Hairston II  Admitting diagnosis:  Mastoiditis, unspecified laterality [H70.90]  Peripheral vertigo, unspecified laterality [H81.399]  Acute bilateral mastoiditis [H70.003]  Admit Date:  9/9/2022  Precautions/Restrictions/WB Status/ Lines/ Wounds/ Oxygen: Fall risk, Bed/chair alarm, and Lines -IV    Treatment Time:  8155-4783  Treatment Number:  1   Timed Code Treatment Minutes: 23 minutes  Total Treatment Minutes:  33  minutes    Patient Goals for Therapy: \" Go home \"          Discharge Recommendations: Home initial 24/7 supervision (1-2 days), OP PT   DME needs for discharge: Needs Met       Therapy recommendation for EMS Transport: can transport by wheelchair    Therapy recommendations for staff:   Stand by assist with use of gait belt for all transfers and ambulation to/from chair  to/from bathroom  within room    History of Present Illness: 47 y.o. male with past medical history of obesity, hyperlipidemia and hypertension with c/o dizziness. Home Health S4 Level Recommendation:  NA  AM-PAC Mobility Score    AM-PAC Inpatient Mobility Raw Score : 22       Preadmission Environment    Pt. Lives Alone  Home environment:  mobile home/trailer  Steps to enter first floor:  3-4 steps to enter and hand rail unilateral  Bathroom: tub/shower unit and standard height commode  Equipment owned: none    Preadmission Status:  Pt. Jyoti Vera to drive: Yes - currently doesn't have vehicle  Pt Fully independent with ADLs: Yes  Pt. Required assistance from family for: Cleaning, Cooking, and Laundry   Pt. independent for transfers and gait and walked with No Device  History of falls No  Works as   Hobbies: fishing    Per pt, history of dizziness episode: Dizziness has been present since 9/9/22 AM. Was putting down laminate jennifer. Was initially bending over floor working.  Got up to walk to counter to take new measurements. Dizziness initiated when standing at counter looking down at jennifer. Dizziness (\"room spinning\") occurs when walking, reports does not occur when standing still. Pt reports gaze stabilization assists with dizziness    Pain   Yes - reports discomfort from laying in bed, improved with standing and sitting in chair  Location: Low back  Rating: mild /10  Pain Medicine Status: Denies need    Cognition    A&O x4   Able to follow 2 step commands    Subjective  Patient lying supine in bed with no family present. Pt agreeable to this PT eval & tx. Upper Extremity ROM/Strength  Please see OT evaluation. Lower Extremity ROM / Strength   AROM WFL: Yes  ROM limitations:     Formal strength testing deferred for functional mobility assessment. Lower Extremity Sensation    WFL    Lower Extremity Proprioception:   WFL    Coordination and Tone  WFL    Balance  Sitting:  Normal; Independent  Comments:     Standing: Good ; Supervision  Comments:     Bed Mobility   Supine to Sit:    Modified Independent with HOB elevated  Sit to Supine:   Not Tested  Rolling:   Not Tested  Scooting in sitting: Independent  Scooting in supine:  Not Tested    Transfer Training     Sit to stand:   Independent  Stand to sit:    Independent  Bed to Chair:   Independent with use of No AD    Gait gait completed as indicated below  Distance:      12 ft  Deviations (firm surface/linoleum):  decreased felicia and decreased step length bilaterally  Assistive Device Used:    No AD  Level of Assist:    SBA  Comment: Mild UE guarding reactions noted with increased dizziness    Stair Training deferred, pt unsafe/ not appropriate to complete stairs at this time    Activity Tolerance   Pt completed therapy session with Dizziness noted with positional changes, greatest with ambulation   BP (mmHg) HR (bpm) SpO2 (%) Comments   Supine, at rest 137/86 68 93 No c/o   Seated at /105 67 94 Mild dizziness, able to decrease with focusing on object   Standing 175/102 70 94      Discussed vitals with RN    Positioning Needs   Pt up in chair, alarm set, positioned in proper neutral alignment and pressure relief provided. Call light provided and all needs within reach    Exercises Initiated  Yvonne deferred secondary to treatment focus on functional mobility  NA    Other  None. Patient/Family Education   Pt educated on role of inpatient PT, POC, importance of continued activity, DC recommendations, safety awareness, pacing activity, and calling for assist with mobility. Assessment  Pt seen for Physical Therapy evaluation in acute care setting. Pt demonstrated decreased Activity tolerance and Balance as well as decreased independence with Ambulation. Recommending Home with a few days of 24/7 and OP PT follow up for vertigo assessment upon discharge as patient functioning below baseline level    Goals : To be met in 3 visits:  1). Independent with LE Ex x 10 reps    To be met in 6 visits:  1). Gait: Ambulate 150 ft.  with  Supervision and use of LRAD (least restrictive assistive device)  2). Tolerate B LE exercises 3 sets of 10-15 reps  3). Ascend/descend 3 steps with Supervision with use of hand rail unilateral and No AD    Rehabilitation Potential: Good  Strengths for achieving goals include:   Pt motivated, PLOF, Family Support, and Pt cooperative   Barriers to achieving goals include:    Complexity of condition    Plan    To be seen for 1-2 visits  while in acute care setting for therapeutic exercises, bed mobility, transfers, progressive gait training, balance training, and family/patient education. Signature: Jose Radford, PT, DPT #[id number]    If patient discharges from this facility prior to next visit, this note will serve as the Discharge Summary.

## 2022-09-11 NOTE — PLAN OF CARE
Problem: Discharge Planning  Goal: Discharge to home or other facility with appropriate resources  9/10/2022 2000 by Maximo Sidhu RN  Outcome: Progressing  9/10/2022 1502 by Ashish Ortega RN  Outcome: Progressing     Problem: Safety - Adult  Goal: Free from fall injury  9/10/2022 2000 by Maximo Sidhu RN  Outcome: Progressing  9/10/2022 1502 by Ashish Ortega RN  Outcome: Progressing

## 2022-09-11 NOTE — PROGRESS NOTES
IM Progress Note    Admit Date:  9/9/2022  0    Interval history:  dizziness , unable to walk   Unable to do PT with dizziness. Started prednisone   No fevers  BP stable     Subjective:  Mr. Bethany Cline still feels dizzy with standing or getup from lying position       Objective:   BP (!) 147/79   Pulse 70   Temp 97.7 °F (36.5 °C) (Oral)   Resp 16   Ht 5' 11\" (1.803 m)   Wt (!) 333 lb 4.8 oz (151.2 kg)   SpO2 95%   BMI 46.49 kg/m²     Intake/Output Summary (Last 24 hours) at 9/11/2022 0726  Last data filed at 9/10/2022 1935  Gross per 24 hour   Intake 480 ml   Output 1650 ml   Net -1170 ml       Physical Exam:  General:  obese middle aged male, Awake, alert and oriented. Appears to be not in any distress  Mucous Membranes:  Pink , anicteric  No mastoid tenderness noted  External ear normal TM normal with no effusion or erythema  EOM intact, no nystagmus. Symptoms not reproducible today  Neck: No JVD, no carotid bruit, no thyromegaly  Chest:  Clear to auscultation bilaterally, no added sounds  Cardiovascular:  RRR S1S2 heard, no murmurs or gallops  Abdomen:  Soft, undistended, non tender, no organomegaly, BS present  Extremities: No edema or cyanosis.  Distal pulses well felt  Neurological : no focal deficits  Non focal   CN 2 to 12 intact, coordination normal   Rombergs neg   Gait not checked as pt reported symptoms        Medications:   Scheduled Medications:    predniSONE  40 mg Oral Daily    lisinopril-hydroCHLOROthiazide  1 tablet Oral Daily    sodium chloride flush  5-40 mL IntraVENous 2 times per day    enoxaparin  30 mg SubCUTAneous BID    covid-19 vaccine  1 Dose IntraMUSCular Prior to discharge     I   sodium chloride       meclizine, sodium chloride flush, sodium chloride, ondansetron **OR** ondansetron, polyethylene glycol, acetaminophen **OR** acetaminophen    Lab Data:  Recent Labs     09/09/22  1305 09/10/22  0551   WBC 11.3* 10.6   HGB 15.0 14.1   HCT 44.0 42.9   MCV 87.5 88.7    281 Recent Labs     09/09/22  1305 09/10/22  0551    138   K 3.8 4.2    101   CO2 26 29   BUN 16 16   CREATININE 0.8* 0.8*     Recent Labs     09/09/22  1305   TROPONINI <0.01       Coagulation: No results found for: INR, APTT  Cardiac markers:   Lab Results   Component Value Date/Time    TROPONINI <0.01 09/09/2022 01:05 PM         Lab Results   Component Value Date    ALT 14 09/09/2022    AST 17 09/09/2022    ALKPHOS 79 09/09/2022    BILITOT 0.3 09/09/2022        EKG: NSR      Radiology:     CTA head and neck      1. No evidence of a flow limiting stenosis or dissection of the cervical   carotid/vertebral arteries. 2. No significant stenosis of the wvkakh-bj-Ncvghl. Ct head     no acute intracranial abnormalities are noted. Bilateral mastoiditis        ASSESMENT & PLAN:      Bilateral mastoiditis - doubt this although ct reported  Pt with no mastoid tenderness, no fevers  Continue abx - rocephin     Dizziness - likely peripheral vertigo - CTA head and neck neg  Ct head neg  No focal symptoms  Obtain PT eval   Meclizine prn . Avoid scheduled dose given rebound symptoms      HTN - resume home meds.  Check orthostatics     Diet:regular   GI/DVT PX: Jose Link  CODE STATUS: full      Marlene Dunaway MD, 9/11/2022 7:26 AM

## 2022-09-12 VITALS
HEART RATE: 73 BPM | HEIGHT: 71 IN | WEIGHT: 315 LBS | SYSTOLIC BLOOD PRESSURE: 140 MMHG | TEMPERATURE: 98.2 F | RESPIRATION RATE: 16 BRPM | BODY MASS INDEX: 44.1 KG/M2 | OXYGEN SATURATION: 94 % | DIASTOLIC BLOOD PRESSURE: 92 MMHG

## 2022-09-12 PROCEDURE — G0378 HOSPITAL OBSERVATION PER HR: HCPCS

## 2022-09-12 PROCEDURE — 99217 PR OBSERVATION CARE DISCHARGE MANAGEMENT: CPT | Performed by: INTERNAL MEDICINE

## 2022-09-12 PROCEDURE — 96372 THER/PROPH/DIAG INJ SC/IM: CPT

## 2022-09-12 PROCEDURE — 6370000000 HC RX 637 (ALT 250 FOR IP): Performed by: NURSE PRACTITIONER

## 2022-09-12 PROCEDURE — 6370000000 HC RX 637 (ALT 250 FOR IP): Performed by: INTERNAL MEDICINE

## 2022-09-12 PROCEDURE — 2580000003 HC RX 258: Performed by: NURSE PRACTITIONER

## 2022-09-12 PROCEDURE — 96366 THER/PROPH/DIAG IV INF ADDON: CPT

## 2022-09-12 PROCEDURE — 96361 HYDRATE IV INFUSION ADD-ON: CPT

## 2022-09-12 PROCEDURE — 2580000003 HC RX 258: Performed by: INTERNAL MEDICINE

## 2022-09-12 PROCEDURE — 6360000002 HC RX W HCPCS: Performed by: INTERNAL MEDICINE

## 2022-09-12 PROCEDURE — 6360000002 HC RX W HCPCS: Performed by: NURSE PRACTITIONER

## 2022-09-12 RX ORDER — PREDNISONE 20 MG/1
20 TABLET ORAL DAILY
Qty: 3 TABLET | Refills: 0 | Status: SHIPPED | OUTPATIENT
Start: 2022-09-12 | End: 2022-09-15

## 2022-09-12 RX ORDER — AMOXICILLIN AND CLAVULANATE POTASSIUM 875; 125 MG/1; MG/1
1 TABLET, FILM COATED ORAL 2 TIMES DAILY
Qty: 20 TABLET | Refills: 0 | Status: SHIPPED | OUTPATIENT
Start: 2022-09-12 | End: 2022-09-22

## 2022-09-12 RX ORDER — ENOXAPARIN SODIUM 100 MG/ML
40 INJECTION SUBCUTANEOUS 2 TIMES DAILY
Status: DISCONTINUED | OUTPATIENT
Start: 2022-09-12 | End: 2022-09-12 | Stop reason: HOSPADM

## 2022-09-12 RX ORDER — MECLIZINE HYDROCHLORIDE 25 MG/1
25 TABLET ORAL 3 TIMES DAILY PRN
Qty: 30 TABLET | Refills: 0 | Status: SHIPPED | OUTPATIENT
Start: 2022-09-12 | End: 2022-09-22

## 2022-09-12 RX ORDER — LISINOPRIL AND HYDROCHLOROTHIAZIDE 12.5; 1 MG/1; MG/1
1 TABLET ORAL DAILY
Qty: 30 TABLET | Refills: 0 | Status: SHIPPED | OUTPATIENT
Start: 2022-09-12

## 2022-09-12 RX ADMIN — PREDNISONE 40 MG: 20 TABLET ORAL at 08:20

## 2022-09-12 RX ADMIN — ENOXAPARIN SODIUM 30 MG: 100 INJECTION SUBCUTANEOUS at 08:20

## 2022-09-12 RX ADMIN — LISINOPRIL AND HYDROCHLOROTHIAZIDE 1 TABLET: 12.5; 1 TABLET ORAL at 08:20

## 2022-09-12 RX ADMIN — CEFTRIAXONE SODIUM 1000 MG: 1 INJECTION, POWDER, FOR SOLUTION INTRAMUSCULAR; INTRAVENOUS at 08:27

## 2022-09-12 RX ADMIN — SODIUM CHLORIDE: 9 INJECTION, SOLUTION INTRAVENOUS at 08:25

## 2022-09-12 RX ADMIN — MECLIZINE HYDROCHLORIDE 25 MG: 25 TABLET ORAL at 08:20

## 2022-09-12 RX ADMIN — Medication 10 ML: at 08:21

## 2022-09-12 NOTE — PLAN OF CARE
Problem: Discharge Planning  Goal: Discharge to home or other facility with appropriate resources  9/12/2022 0812 by Steen Lanes, RN  Outcome: Progressing  Flowsheets (Taken 9/12/2022 0806)  Discharge to home or other facility with appropriate resources:   Identify barriers to discharge with patient and caregiver   Arrange for needed discharge resources and transportation as appropriate   Identify discharge learning needs (meds, wound care, etc)  9/11/2022 2131 by Nico Luong RN  Outcome: Progressing     Problem: Safety - Adult  Goal: Free from fall injury  9/12/2022 0812 by Steen Lanes, RN  Outcome: Progressing  9/11/2022 2131 by Nico Luong RN  Outcome: Progressing

## 2022-09-12 NOTE — FLOWSHEET NOTE
09/11/22 2059   Vital Signs   Temp 98.1 °F (36.7 °C)   Temp Source Oral   Heart Rate 81   Heart Rate Source Monitor   Resp 16   BP (!) 165/66   BP Location Left upper arm   BP Method Automatic   MAP (Calculated) 99   Patient Position Semi fowlers   Level of Consciousness 0   MEWS Score 1   Oxygen Therapy   SpO2 94 %   O2 Device None (Room air)   Pt A/O assessment completed. Meds given per MAR. Pt denies any needs at this time.  Call light within reach

## 2022-09-12 NOTE — PROGRESS NOTES
AM assessment complete. See flowsheets. Gave am meds due see mar. PRN antivert given for dizziness. A/O x 4. Up in chair. Denies any pain. Ice water given. Chair locked in place, Call light within reach.

## 2022-09-12 NOTE — PROGRESS NOTES
Bedside report given to [facility]  pt in stable condition no needs at this time.  Call light within reach

## 2022-09-12 NOTE — PLAN OF CARE
Problem: Discharge Planning  Goal: Discharge to home or other facility with appropriate resources  9/11/2022 2131 by Meli Mcgowan RN  Outcome: Progressing  9/11/2022 1344 by Daysi Gaona RN  Outcome: Progressing     Problem: Safety - Adult  Goal: Free from fall injury  9/11/2022 2131 by Meli Mcgowan RN  Outcome: Progressing  9/11/2022 1344 by Daysi Gaona RN  Outcome: Progressing

## 2022-09-12 NOTE — PROGRESS NOTES
IM Progress Note    Admit Date:  9/9/2022  0    Interval history:  dizziness , unable to walk   Unable to do PT with dizziness. Started prednisone   No fevers  BP stable     Subjective:  Mr. Jairo Dooley still feels dizzy but improving slowly   Reports facial swelling improved on right side  No falls  Worked with PT and did well  Worried about work        Objective:   BP (!) 125/93   Pulse 64   Temp 97.7 °F (36.5 °C) (Oral)   Resp 16   Ht 5' 11\" (1.803 m)   Wt (!) 333 lb 4.8 oz (151.2 kg)   SpO2 96%   BMI 46.49 kg/m²     Intake/Output Summary (Last 24 hours) at 9/12/2022 0723  Last data filed at 9/11/2022 1314  Gross per 24 hour   Intake 240 ml   Output 700 ml   Net -460 ml         Physical Exam:  General:  obese middle aged male, Awake, alert and oriented. Appears to be not in any distress  Mucous Membranes:  Pink , anicteric  No mastoid tenderness noted  External ear normal TM normal with no effusion or erythema  EOM intact, no nystagmus. Symptoms not reproducible today  Neck: No JVD, no carotid bruit, no thyromegaly  Chest:  Clear to auscultation bilaterally, no added sounds  Cardiovascular:  RRR S1S2 heard, no murmurs or gallops  Abdomen:  Soft, undistended, non tender, no organomegaly, BS present  Extremities: No edema or cyanosis.  Distal pulses well felt  Neurological : no focal deficits  Non focal   CN 2 to 12 intact, coordination normal   Rombergs neg        Medications:   Scheduled Medications:    cefTRIAXone (ROCEPHIN) IV  1,000 mg IntraVENous Q24H    predniSONE  40 mg Oral Daily    lisinopril-hydroCHLOROthiazide  1 tablet Oral Daily    sodium chloride flush  5-40 mL IntraVENous 2 times per day    enoxaparin  30 mg SubCUTAneous BID    covid-19 vaccine  1 Dose IntraMUSCular Prior to discharge     I   sodium chloride 5 mL/hr at 09/11/22 1136     meclizine, sodium chloride flush, sodium chloride, ondansetron **OR** ondansetron, polyethylene glycol, acetaminophen **OR** acetaminophen    Lab Data:  Recent Labs     09/09/22  1305 09/10/22  0551   WBC 11.3* 10.6   HGB 15.0 14.1   HCT 44.0 42.9   MCV 87.5 88.7    281       Recent Labs     09/09/22  1305 09/10/22  0551    138   K 3.8 4.2    101   CO2 26 29   BUN 16 16   CREATININE 0.8* 0.8*       Recent Labs     09/09/22  1305   TROPONINI <0.01         Coagulation: No results found for: INR, APTT  Cardiac markers:   Lab Results   Component Value Date/Time    TROPONINI <0.01 09/09/2022 01:05 PM         Lab Results   Component Value Date    ALT 14 09/09/2022    AST 17 09/09/2022    ALKPHOS 79 09/09/2022    BILITOT 0.3 09/09/2022        EKG: NSR      Radiology:     CTA head and neck      1. No evidence of a flow limiting stenosis or dissection of the cervical   carotid/vertebral arteries. 2. No significant stenosis of the rjaooj-wo-Vmhxrr. Ct head     no acute intracranial abnormalities are noted. Bilateral mastoiditis        ASSESMENT & PLAN:      Bilateral mastoiditis - doubt this although ct reported  Pt with no mastoid tenderness, no fevers  Continue abx - rocephin- change to augmentin     Dizziness - likely peripheral vertigo - CTA head and neck neg  Ct head neg  No focal symptoms  Obtained PT eval and did well   Meclizine prn . Avoid scheduled dose given rebound symptoms      HTN - high as pt not taking meds.  Resume ACEI        Diet:regular   GI/DVT PX: Jyoti Womack  CODE STATUS: full      Lucian Shah MD, 9/12/2022 7:23 AM

## 2022-09-12 NOTE — PROGRESS NOTES
Prescriptions: augmentin, prednisone, antivert, lisinopril-HCTZ and discharge instructions given. Pt verbalized understanding denies any questions/ needs at this time. Return to work note given.  Transport called to transport pt to vehicle for discharge home

## 2022-10-19 NOTE — DISCHARGE SUMMARY
Name:  Josh Lyn  Room:  0204/0204-01  MRN:    [id number]    IM Discharge Summary    Discharging Physician:  Yogi Mariscal MD    Admit: 9/9/2022    Discharge:  9/12/2022    PCP      Jemima Mcrae, DO    Diagnoses and hospital course  this Admission      Bilateral mastoiditis - doubt this although ct reported  Pt with no mastoid tenderness, no fevers  Tx with  abx - rocephin IV x 2 days - changed to augmentin at dc      Dizziness - likely peripheral vertigo - CTA head and neck neg  Ct head neg  No focal symptoms  Obtained PT eval and did well   Meclizine prn . Avoid scheduled dose given rebound symptoms      HTN - high as pt not taking meds. Resumed ACEI and refilled meds         HPI    47 y.o.  male  With known h.o  HTN, hyperlipidemia, obesity presented for increasing dizziness for last 2 days. Reports he has bilateral ear pain with no fevers or chills. No associated blurry vision or double vision or neck pain . Reports dizziness occurs anytime he bends or moves his neck and room spins around and feels he is going to fall. No unilateral arm or leg weakness . No new headache or recent trauma. No recent surgeries . Workup in ER showed possible bilateral mastoiditis and no relief with temporary meds and was admitted for overnight observation     Physical Exam at Discharge:      General:  obese middle aged male, Awake, alert and oriented. Appears to be not in any distress  Mucous Membranes:  Pink , anicteric  No mastoid tenderness noted  External ear normal TM normal with no effusion or erythema  EOM intact, no nystagmus. Symptoms not reproducible today  Neck: No JVD, no carotid bruit, no thyromegaly  Chest:  Clear to auscultation bilaterally, no added sounds  Cardiovascular:  RRR S1S2 heard, no murmurs or gallops  Abdomen:  Soft, undistended, non tender, no organomegaly, BS present  Extremities: No edema or cyanosis.  Distal pulses well felt  Neurological : no focal deficits  Non focal   CN 2 to 12 intact, coordination normal   Rombergs neg        Radiology (Please Review Full Report for Details)     EKG: NSR      Radiology:     CTA head and neck      1. No evidence of a flow limiting stenosis or dissection of the cervical   carotid/vertebral arteries. 2. No significant stenosis of the xhrlyf-so-Jpktzz. Ct head     no acute intracranial abnormalities are noted. Bilateral mastoiditis         Consults:    1. PT      No results for input(s): WBC, HGB, HCT, MCV, PLT in the last 72 hours. No results for input(s): NA, K, CL, CO2, PHOS, BUN, CREATININE, CA in the last 72 hours. CBC:  Lab Results   Component Value Date/Time    WBC 10.6 09/10/2022 05:51 AM    HGB 14.1 09/10/2022 05:51 AM    HCT 42.9 09/10/2022 05:51 AM    MCV 88.7 09/10/2022 05:51 AM     09/10/2022 05:51 AM    NEUTOPHILPCT 73.6 09/10/2022 05:51 AM    LYMPHOPCT 17.8 09/10/2022 05:51 AM    MONOPCT 6.9 09/10/2022 05:51 AM    BASOPCT 0.6 09/10/2022 05:51 AM    NEUTROABS 7.8 09/10/2022 05:51 AM    LYMPHSABS 1.9 09/10/2022 05:51 AM    MONOSABS 0.7 09/10/2022 05:51 AM    EOSABS 0.1 09/10/2022 05:51 AM    BASOSABS 0.1 09/10/2022 05:51 AM     BNP:   Lab Results   Component Value Date/Time     09/10/2022 05:51 AM    K 4.2 09/10/2022 05:51 AM    CO2 29 09/10/2022 05:51 AM    BUN 16 09/10/2022 05:51 AM    CREATININE 0.8 09/10/2022 05:51 AM    CALCIUM 8.6 09/10/2022 05:51 AM                Medication List        CONTINUE taking these medications      lisinopril-hydroCHLOROthiazide 10-12.5 MG per tablet  Commonly known as: PRINZIDE;ZESTORETIC  Take 1 tablet by mouth daily            ASK your doctor about these medications      amoxicillin-clavulanate 875-125 MG per tablet  Commonly known as:  Augmentin  Take 1 tablet by mouth 2 times daily for 10 days  Ask about: Should I take this medication?     meclizine 25 MG tablet  Commonly known as: ANTIVERT  Take 1 tablet by mouth 3 times daily as needed for Dizziness  Ask about: Should I take this medication?     predniSONE 20 MG tablet  Commonly known as: DELTASONE  Take 1 tablet by mouth daily for 3 days  Ask about: Should I take this medication? Where to Get Your Medications        These medications were sent to [id number] [facility], [address] [place] [address] [address], [address] [address]      Phone: [phone]   lisinopril-hydroCHLOROthiazide 10-12.5 MG per tablet  meclizine 25 MG tablet  predniSONE 20 MG tablet       You can get these medications from any pharmacy    Bring a paper prescription for each of these medications  amoxicillin-clavulanate 875-125 MG per tablet           Discharge Condition/Location: stable/ home     Follow Up:    PCP in 1 weeks      Time Spent on discharge is more than 28 minutes in the examination, evaluation, counseling and review of medications and discharge plan.       Monica Chavez MD, 10/19/2022 10:43 AM

## 2023-08-05 ENCOUNTER — HOSPITAL ENCOUNTER (EMERGENCY)
Age: 53
Discharge: HOME | End: 2023-08-05
Attending: EMERGENCY MEDICINE
Payer: COMMERCIAL

## 2023-08-05 VITALS
HEIGHT: 71 IN | TEMPERATURE: 98.4 F | HEART RATE: 76 BPM | DIASTOLIC BLOOD PRESSURE: 108 MMHG | RESPIRATION RATE: 16 BRPM | BODY MASS INDEX: 44.1 KG/M2 | WEIGHT: 315 LBS | OXYGEN SATURATION: 97 % | SYSTOLIC BLOOD PRESSURE: 209 MMHG

## 2023-08-05 DIAGNOSIS — K08.89 PAIN, DENTAL: Primary | ICD-10-CM

## 2023-08-05 DIAGNOSIS — I10 ESSENTIAL HYPERTENSION: ICD-10-CM

## 2023-08-05 DIAGNOSIS — Z91.148 NONCOMPLIANCE WITH MEDICATIONS: ICD-10-CM

## 2023-08-05 PROCEDURE — 99284 EMERGENCY DEPT VISIT MOD MDM: CPT

## 2023-08-05 PROCEDURE — 6360000002 HC RX W HCPCS: Performed by: EMERGENCY MEDICINE

## 2023-08-05 PROCEDURE — 6370000000 HC RX 637 (ALT 250 FOR IP): Performed by: EMERGENCY MEDICINE

## 2023-08-05 PROCEDURE — 96372 THER/PROPH/DIAG INJ SC/IM: CPT

## 2023-08-05 RX ORDER — LISINOPRIL AND HYDROCHLOROTHIAZIDE 12.5; 1 MG/1; MG/1
1 TABLET ORAL DAILY
Qty: 30 TABLET | Refills: 0 | Status: SHIPPED | OUTPATIENT
Start: 2023-08-05

## 2023-08-05 RX ORDER — KETOROLAC TROMETHAMINE 30 MG/ML
30 INJECTION, SOLUTION INTRAMUSCULAR; INTRAVENOUS ONCE
Status: COMPLETED | OUTPATIENT
Start: 2023-08-05 | End: 2023-08-05

## 2023-08-05 RX ORDER — LISINOPRIL AND HYDROCHLOROTHIAZIDE 12.5; 1 MG/1; MG/1
1 TABLET ORAL ONCE
Status: DISCONTINUED | OUTPATIENT
Start: 2023-08-05 | End: 2023-08-05 | Stop reason: SDUPTHER

## 2023-08-05 RX ORDER — CLINDAMYCIN HYDROCHLORIDE 300 MG/1
300 CAPSULE ORAL 3 TIMES DAILY
Qty: 15 CAPSULE | Refills: 0 | Status: SHIPPED | OUTPATIENT
Start: 2023-08-05 | End: 2023-08-10

## 2023-08-05 RX ORDER — HYDROCHLOROTHIAZIDE 25 MG/1
12.5 TABLET ORAL ONCE
Status: COMPLETED | OUTPATIENT
Start: 2023-08-05 | End: 2023-08-05

## 2023-08-05 RX ORDER — LISINOPRIL 10 MG/1
10 TABLET ORAL ONCE
Status: COMPLETED | OUTPATIENT
Start: 2023-08-05 | End: 2023-08-05

## 2023-08-05 RX ADMIN — KETOROLAC TROMETHAMINE 30 MG: 30 INJECTION, SOLUTION INTRAMUSCULAR; INTRAVENOUS at 15:27

## 2023-08-05 RX ADMIN — LISINOPRIL 10 MG: 10 TABLET ORAL at 15:27

## 2023-08-05 RX ADMIN — HYDROCHLOROTHIAZIDE 12.5 MG: 25 TABLET ORAL at 15:27

## 2023-08-05 ASSESSMENT — PAIN DESCRIPTION - ONSET: ONSET: GRADUAL

## 2023-08-05 ASSESSMENT — PAIN DESCRIPTION - LOCATION: LOCATION: TEETH

## 2023-08-05 ASSESSMENT — PAIN SCALES - GENERAL
PAINLEVEL_OUTOF10: 7
PAINLEVEL_OUTOF10: 0
PAINLEVEL_OUTOF10: 7

## 2023-08-05 ASSESSMENT — PAIN - FUNCTIONAL ASSESSMENT
PAIN_FUNCTIONAL_ASSESSMENT: 0-10
PAIN_FUNCTIONAL_ASSESSMENT: NONE - DENIES PAIN

## 2023-08-05 ASSESSMENT — PAIN DESCRIPTION - PAIN TYPE: TYPE: ACUTE PAIN

## 2023-08-05 ASSESSMENT — PAIN DESCRIPTION - FREQUENCY: FREQUENCY: CONTINUOUS

## 2023-08-05 ASSESSMENT — PAIN DESCRIPTION - DESCRIPTORS: DESCRIPTORS: ACHING;SHARP

## 2023-08-05 ASSESSMENT — PAIN DESCRIPTION - ORIENTATION: ORIENTATION: LEFT

## 2023-08-05 NOTE — ED NOTES
Pt stated he stopped taking his BP meds a few months ago, because he was making diet changes and didn't feel he needed it anymore.       Nilsa Riley RN  08/05/23 0584
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
